# Patient Record
Sex: MALE | Race: BLACK OR AFRICAN AMERICAN
[De-identification: names, ages, dates, MRNs, and addresses within clinical notes are randomized per-mention and may not be internally consistent; named-entity substitution may affect disease eponyms.]

---

## 2020-03-19 ENCOUNTER — HOSPITAL ENCOUNTER (EMERGENCY)
Dept: HOSPITAL 26 - MED | Age: 30
Discharge: HOME | End: 2020-03-19
Payer: MEDICAID

## 2020-03-19 VITALS — DIASTOLIC BLOOD PRESSURE: 76 MMHG | SYSTOLIC BLOOD PRESSURE: 120 MMHG

## 2020-03-19 VITALS — HEIGHT: 65 IN | BODY MASS INDEX: 31.32 KG/M2 | WEIGHT: 188 LBS

## 2020-03-19 VITALS — SYSTOLIC BLOOD PRESSURE: 120 MMHG | DIASTOLIC BLOOD PRESSURE: 76 MMHG

## 2020-03-19 DIAGNOSIS — R50.9: ICD-10-CM

## 2020-03-19 DIAGNOSIS — J02.9: Primary | ICD-10-CM

## 2020-03-19 DIAGNOSIS — R05: ICD-10-CM

## 2020-03-19 NOTE — NUR
30 Y/O MALE C/O SORE THROAT X 4 DAYS NOW AND HAS HEADACHE TODAY WITH ACHING 
PAIN 6/10. PT DENIES ANY TRAUMA. PT STATES HAS BEEN TAKING IBUPROFEN AND NYQUIL 
WITH SIGNIFICANT RELIEF. DENIES N/V/D; SKIN IS PINK/WARM/DRY; AAOX4 WITH EVEN 
AND STEADY GAIT; PT DENIES ANY FEVER, CP, SOB, OR COUGH AT THIS TIME; VSS; 
PATIENT POSITIONED FOR COMFORT; HOB ELEVATED; BEDRAILS UP X1; BED DOWN AND 
WHEELS LOCKED .



MEDICAL HX: ANXIETY

NKA

## 2020-03-19 NOTE — NUR
Patient discharged with v/s stable. Written and verbal after care instructions 
given and explained. 

Patient alert, oriented and verbalized understanding of instructions. 
Ambulatory with steady gait. All questions addressed prior to discharge. ID 
band removed. Patient advised to follow up with PMD. Rx of PROMETHAZINE 
SYRUP/IBUPROFEN given. Patient educated on indication of medication including 
possible reaction and side effects. Opportunity to ask questions provided and 
answered.

## 2020-07-26 ENCOUNTER — HOSPITAL ENCOUNTER (EMERGENCY)
Dept: HOSPITAL 26 - MED | Age: 30
Discharge: HOME | End: 2020-07-26
Payer: MEDICAID

## 2020-07-26 VITALS — SYSTOLIC BLOOD PRESSURE: 120 MMHG | DIASTOLIC BLOOD PRESSURE: 80 MMHG

## 2020-07-26 VITALS — DIASTOLIC BLOOD PRESSURE: 80 MMHG | SYSTOLIC BLOOD PRESSURE: 120 MMHG

## 2020-07-26 VITALS — HEIGHT: 65 IN | WEIGHT: 170 LBS | BODY MASS INDEX: 28.32 KG/M2

## 2020-07-26 DIAGNOSIS — R50.9: Primary | ICD-10-CM

## 2020-07-26 DIAGNOSIS — Z20.828: ICD-10-CM

## 2020-07-26 DIAGNOSIS — M79.10: ICD-10-CM

## 2020-07-26 PROCEDURE — 99283 EMERGENCY DEPT VISIT LOW MDM: CPT

## 2020-07-26 NOTE — NUR
Patient discharged with v/s stable. Written and verbal after care instructions 
given and explained. 

Patient alert, oriented and verbalized understanding of instructions. 
Ambulatory with steady gait. All questions addressed prior to discharge. ID 
band removed. Patient advised to follow up with PMD. Rx of NAPROSYN 
&ACETAMINOPHEN given. Patient educated on indication of medication including 
possible reaction and side effects. Opportunity to ask questions provided and 
answered.

## 2020-07-26 NOTE — NUR
30/M BIB SELF C/O FEVER , BODY ACHES X YESTERDAY. DENIES COWORKER OR FAMILY HAS 
COVID TEST POSITIVE. MED HX: DENIES. DENIES N/V/D; SKIN IS PINK/WARM/DRY; AAOX4 
WITH EVEN AND STEADY GAIT; LUNGS CLEAR BL; HR EVEN AND REGULAR; PT DENIES ANY 
FEVER, CP, SOB, OR COUGH AT THIS TIME; PATIENT STATES PAIN OF 0/10 AT THIS 
TIME; VSS.

## 2020-08-31 ENCOUNTER — HOSPITAL ENCOUNTER (EMERGENCY)
Dept: HOSPITAL 26 - MED | Age: 30
Discharge: HOME | End: 2020-08-31
Payer: MEDICAID

## 2020-08-31 VITALS — HEIGHT: 65 IN | BODY MASS INDEX: 28.32 KG/M2 | WEIGHT: 170 LBS

## 2020-08-31 VITALS — SYSTOLIC BLOOD PRESSURE: 130 MMHG | DIASTOLIC BLOOD PRESSURE: 87 MMHG

## 2020-08-31 VITALS — DIASTOLIC BLOOD PRESSURE: 87 MMHG | SYSTOLIC BLOOD PRESSURE: 130 MMHG

## 2020-08-31 DIAGNOSIS — R03.0: ICD-10-CM

## 2020-08-31 DIAGNOSIS — L30.9: Primary | ICD-10-CM

## 2020-08-31 NOTE — NUR
BODY RASH FOR 2 WEEKS ON ARMPIT , NECK , LEGS. NO C/O PAIN . PT AOX4 , AFIBRILE 
, AMBULATORY WITH STEADY GAIT ,DENIES SOB NOR ALLERGIES TO FOOD AND MEDS, PINK 
PALPEBRAL CONJUNCTIVA , ANICTERIC SCLERA , SCE , RASH IN NECK , BILATERAL 
ARMPIT AND LEGS.

PMHXS DENIES.

## 2020-08-31 NOTE — NUR
Patient discharged with v/s stable. Written and verbal after care instructions 
given and explained regarding eczema.

Patient alert, oriented and verbalized understanding of instructions. 
Ambulatory with steady gait. All questions addressed prior to discharge. ID 
band removed. Patient advised to follow up with PMD. Rx of hydrocortisone 
ointment  given. Patient educated on indication of medication including 
possible reaction and side effects. Opportunity to ask questions provided and 
answered.

## 2022-04-25 ENCOUNTER — HOSPITAL ENCOUNTER (EMERGENCY)
Dept: HOSPITAL 26 - MED | Age: 32
Discharge: HOME | End: 2022-04-25
Payer: MEDICAID

## 2022-04-25 VITALS — DIASTOLIC BLOOD PRESSURE: 75 MMHG | SYSTOLIC BLOOD PRESSURE: 129 MMHG

## 2022-04-25 VITALS — HEIGHT: 65 IN | WEIGHT: 193 LBS | BODY MASS INDEX: 32.15 KG/M2

## 2022-04-25 VITALS — SYSTOLIC BLOOD PRESSURE: 122 MMHG | DIASTOLIC BLOOD PRESSURE: 76 MMHG

## 2022-04-25 DIAGNOSIS — H57.11: Primary | ICD-10-CM

## 2022-04-25 DIAGNOSIS — H53.141: ICD-10-CM

## 2022-04-25 PROCEDURE — 99283 EMERGENCY DEPT VISIT LOW MDM: CPT

## 2022-07-21 ENCOUNTER — HOSPITAL ENCOUNTER (EMERGENCY)
Dept: HOSPITAL 26 - MED | Age: 32
Discharge: HOME | End: 2022-07-21
Payer: MEDICAID

## 2022-07-21 VITALS — DIASTOLIC BLOOD PRESSURE: 91 MMHG | SYSTOLIC BLOOD PRESSURE: 132 MMHG

## 2022-07-21 VITALS — HEIGHT: 65 IN | BODY MASS INDEX: 32.65 KG/M2 | WEIGHT: 196 LBS
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
## 2022-07-21 DIAGNOSIS — Z79.899: ICD-10-CM
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
## 2022-07-21 DIAGNOSIS — S61.012A: Primary | ICD-10-CM

## 2022-07-21 PROCEDURE — 99284 EMERGENCY DEPT VISIT MOD MDM: CPT

## 2022-07-21 PROCEDURE — 90715 TDAP VACCINE 7 YRS/> IM: CPT

## 2022-07-21 PROCEDURE — 90471 IMMUNIZATION ADMIN: CPT

## 2022-07-21 PROCEDURE — 96372 THER/PROPH/DIAG INJ SC/IM: CPT

## 2022-07-21 RX ADMIN — BACITRACIN ZINC ONE UNITS: 500 OINTMENT TOPICAL at 08:17

## 2022-07-21 RX ADMIN — KETOROLAC TROMETHAMINE ONE MG: 60 INJECTION, SOLUTION INTRAMUSCULAR at 08:12

## 2022-07-21 NOTE — NUR
32YR OLD MALE BIB SELF C/O LACERATION L HAND .  TODAY THIS AM LEFT GREAT THUMB. 
PT STATES WAS SHARPING KNIFE WHEN LACERATION OCCURED.  NO ACTIVE BLEEDING.  
5/10 PAIN LEVEL.  PT A&OX4.  PT SITTING UP ON SIDE OF BED. BED RAILS DOWN X2.  
BED AT LOWEST POSITION.  



NKDA

NO MED HX

## 2022-07-21 NOTE — NUR
PER ERMD, PT LACERATION TO L THUMB IRRIGATED AND DRESSED WITH NON-ADHERENT. + 
CMS AFTER WOUND CARE.